# Patient Record
Sex: MALE
[De-identification: names, ages, dates, MRNs, and addresses within clinical notes are randomized per-mention and may not be internally consistent; named-entity substitution may affect disease eponyms.]

---

## 2023-02-21 PROBLEM — Z00.00 ENCOUNTER FOR PREVENTIVE HEALTH EXAMINATION: Status: ACTIVE | Noted: 2023-02-21

## 2023-03-06 ENCOUNTER — APPOINTMENT (OUTPATIENT)
Dept: COLORECTAL SURGERY | Facility: CLINIC | Age: 43
End: 2023-03-06
Payer: COMMERCIAL

## 2023-03-06 VITALS
WEIGHT: 185 LBS | HEART RATE: 111 BPM | DIASTOLIC BLOOD PRESSURE: 82 MMHG | BODY MASS INDEX: 22.53 KG/M2 | SYSTOLIC BLOOD PRESSURE: 111 MMHG | TEMPERATURE: 97.2 F | HEIGHT: 76 IN | OXYGEN SATURATION: 98 % | RESPIRATION RATE: 16 BRPM

## 2023-03-06 DIAGNOSIS — K64.4 RESIDUAL HEMORRHOIDAL SKIN TAGS: ICD-10-CM

## 2023-03-06 PROCEDURE — 99203 OFFICE O/P NEW LOW 30 MIN: CPT

## 2023-03-06 NOTE — HISTORY OF PRESENT ILLNESS
[FreeTextEntry1] : 41 y/o M presents for initial evaluation of hemorrhoids. \par \par PMH: Denies\par PSH: Broken thumb - 6 screws and a plate (had surgery 2 weeks ago, still in splint), hemorrhoid rubber band ligation x 2 (8/2020)\par Denies [facility] Colorectal CA or IBD\par \par Saw Dr Kendrick 3 years ago for BRB on TP. \par \par Colonoscopy/EGD 4/29/2019\par - columnar mucosa showing intestinal metaplasia c/w Loera's esophagus. Negative for dysplasia.\par - Sigmoid polypectomy revealed tubular adenoma\par \par Last Flex sig 8/17/2020 Dr. Poonam Kendrick:\par - perianal swollen external hemorrhoids skin tags found on perianal exam\par - normal mucosa in the rectum and in distal sigmoid colon\par - Non bleeding internal hemorrhoids, medium sized, Grade II, s/p two bands placed. \par - No specimens collected. \par  \par After rubber banding, he felt his hemorrhoids worsened and external hemorrhoids formed (patient thinks due to bowel prep). \par Daily BM. Toilet time is 10-15 minutes. Reports fragmented BMs and incomplete stooling. \par Admits at times he doesn't drink enough water. \par Sees blood once a month, on TP. And swelling of perianal tissue. \par Dr Kendrick recommended fiber supplement. \par He recently started taking magnesium oxide supplement from his girlfriend to help with BM (2 capsules per day) and going easier. Less swelling. And hemorrhoid symptoms decreased. \par After BM he lays flat on ground and the swelling goes down.\par \par Previously when lived in [place], did warm baths and sitz baths and used preparation H. He found this is not as effective as laying down, so no longer doing. \par He had Rx hydrocortisone previously in [place] but not since. \par Moved from [place] to [place] about 3 years ago. \par

## 2023-03-06 NOTE — PHYSICAL EXAM
[FreeTextEntry1] : General no acute distress, alert and oriented\par Psych responding appropriately to question\par Well nourished\par Comfortable in chair\par Ambulating without assistance\par Nonlabored breathing\par Abdomen non distended\par Left upper extremity splint in place. \par \par Patient declines anorectal examination today, citing recent thumb surgery impacting his mobility to participate in a physical exam.

## 2023-03-06 NOTE — ASSESSMENT
[FreeTextEntry1] : Diagnosis and management options discussed at length with patient. \par He was treated with rubber band ligation in past by GI for internal hemorrhoids. Per patient he was referred by GI to discussed external hemorrhoid management. \par Recommendations including increased fiber intake, adequate daily hydration, stool softeners/laxatives as needed, and sitz baths as needed and after bowel movements were discussed.\par Avoid constipation and diarrhea, avoid pushing/straining.\par Management options discussed, including supportive care. Witch hazel and hydrocortisone as needed for symptoms were discussed.\par We discussed surgical hemorrhoidectomy as an alternative. \par The nature of the procedure as well as risks and benefits were reviewed with the patient. Risk/benefits/alternatives discussed at length, including but not limited to pain, bleeding, infection, swelling, recurrence of symptoms, need for future surgery, scarring, and risk of alteration of bowel habits, which may be temporary or permanent. Postprocedural expectations regarding pain, wound cosmesis, and wound healing was discussed.  \par He would like to continue with supportive care. He will follow up for physical exam at a future visit, and to further discuss management options after examination.\par All questions were answered, patient expressed understanding, and is agreeable to this plan.\par \par

## 2024-11-27 VITALS
DIASTOLIC BLOOD PRESSURE: 82 MMHG | HEIGHT: 76 IN | RESPIRATION RATE: 16 BRPM | WEIGHT: 191.14 LBS | OXYGEN SATURATION: 98 % | TEMPERATURE: 98 F | SYSTOLIC BLOOD PRESSURE: 114 MMHG | HEART RATE: 90 BPM

## 2024-11-27 NOTE — ASU PATIENT PROFILE, ADULT - FALL HARM RISK - HARM RISK INTERVENTIONS

## 2024-11-27 NOTE — ASU PATIENT PROFILE, ADULT - NSICDXPASTSURGICALHX_GEN_ALL_CORE_FT
PAST SURGICAL HISTORY:  History of colonoscopy     History of thumb surgery left    History of wisdom tooth extraction     Internal hemorrhoid banding

## 2024-11-29 ENCOUNTER — OUTPATIENT (OUTPATIENT)
Dept: OUTPATIENT SERVICES | Facility: HOSPITAL | Age: 44
LOS: 1 days | Discharge: HOME | End: 2024-11-29
Payer: COMMERCIAL

## 2024-11-29 ENCOUNTER — TRANSCRIPTION ENCOUNTER (OUTPATIENT)
Age: 44
End: 2024-11-29

## 2024-11-29 VITALS
OXYGEN SATURATION: 96 % | HEART RATE: 76 BPM | RESPIRATION RATE: 17 BRPM | DIASTOLIC BLOOD PRESSURE: 75 MMHG | SYSTOLIC BLOOD PRESSURE: 116 MMHG

## 2024-11-29 DIAGNOSIS — Z98.890 OTHER SPECIFIED POSTPROCEDURAL STATES: Chronic | ICD-10-CM

## 2024-11-29 DIAGNOSIS — K64.8 OTHER HEMORRHOIDS: Chronic | ICD-10-CM

## 2024-11-29 DIAGNOSIS — K08.409 PARTIAL LOSS OF TEETH, UNSPECIFIED CAUSE, UNSPECIFIED CLASS: Chronic | ICD-10-CM

## 2024-11-29 PROCEDURE — 46260 REMOVE IN/EX HEM GROUPS 2+: CPT

## 2024-11-29 PROCEDURE — C9399: CPT

## 2024-11-29 PROCEDURE — 88304 TISSUE EXAM BY PATHOLOGIST: CPT

## 2024-11-29 PROCEDURE — 88304 TISSUE EXAM BY PATHOLOGIST: CPT | Mod: 26

## 2024-11-29 RX ORDER — OXYCODONE AND ACETAMINOPHEN 5; 325 MG/1; MG/1
1 TABLET ORAL
Qty: 30 | Refills: 0
Start: 2024-11-29 | End: 2024-12-04

## 2024-11-29 RX ORDER — IRON SUCROSE 20 MG/ML
100 INJECTION, SOLUTION INTRAVENOUS
Refills: 0 | DISCHARGE

## 2024-11-29 RX ORDER — OMEPRAZOLE 20 MG/1
1 CAPSULE, DELAYED RELEASE ORAL
Refills: 0 | DISCHARGE

## 2024-11-29 RX ORDER — FERROUS SULFATE 325(65) MG
0 TABLET ORAL
Refills: 0 | DISCHARGE

## 2024-11-29 RX ORDER — DIAZEPAM 10 MG/1
1 TABLET ORAL
Qty: 4 | Refills: 0
Start: 2024-11-29 | End: 2024-12-02

## 2024-11-29 RX ORDER — HYDROMORPHONE HYDROCHLORIDE 2 MG/1
0.25 TABLET ORAL
Refills: 0 | Status: DISCONTINUED | OUTPATIENT
Start: 2024-11-29 | End: 2024-11-29

## 2024-11-29 RX ORDER — 0.9 % SODIUM CHLORIDE 0.9 %
1000 INTRAVENOUS SOLUTION INTRAVENOUS
Refills: 0 | Status: DISCONTINUED | OUTPATIENT
Start: 2024-11-29 | End: 2024-11-29

## 2024-11-29 RX ORDER — DOCUSATE SODIUM 100 MG
1 CAPSULE ORAL
Qty: 14 | Refills: 0
Start: 2024-11-29 | End: 2024-12-05

## 2024-11-29 RX ORDER — ONDANSETRON HYDROCHLORIDE 4 MG/1
4 TABLET, FILM COATED ORAL ONCE
Refills: 0 | Status: DISCONTINUED | OUTPATIENT
Start: 2024-11-29 | End: 2024-11-29

## 2024-11-29 RX ORDER — NALOXONE HCL 0.4 MG/ML
1 AMPUL (ML) INJECTION
Qty: 1 | Refills: 0
Start: 2024-11-29

## 2024-11-29 NOTE — ASU DISCHARGE PLAN (ADULT/PEDIATRIC) - CARE PROVIDER_API CALL
Geoff Toth.  Colon/Rectal Surgery  [address], [address], [place] [facility]  Phone: [phone]  Fax: [phone]  Follow Up Time:    Geoff Toth.  Colon/Rectal Surgery  [address] [place] [address]  Phone: [phone]  Fax: [phone]  Follow Up Time: 1 month

## 2024-11-29 NOTE — BRIEF OPERATIVE NOTE - OPERATION/FINDINGS
Patient prepped and draped in sterile fashion in prone kayla-knife position. Hemorrhoids visualized in right posterior, right anterior, and left lateral positions. Right posterior hemorrhoid suture ligated at base and excised with electrocautery, closed with running suture and collected as specimen. Hemostasis achieved. Right anterior and left lateral hemorrhoids excised in similar fashion. Hemostasis achieved.

## 2024-11-29 NOTE — ASU DISCHARGE PLAN (ADULT/PEDIATRIC) - FINANCIAL ASSISTANCE
[facility] provides services at a reduced cost to those who are determined to be eligible through [facility]’s financial assistance program. Information regarding [facility]’s financial assistance program can be found by going to [email or web address] or by calling [phone].

## 2024-11-29 NOTE — PRE-ANESTHESIA EVALUATION ADULT - NSANTHOSAYNRD_GEN_A_CORE
No. GORGE screening performed.  STOP BANG Legend: 0-2 = LOW Risk; 3-4 = INTERMEDIATE Risk; 5-8 = HIGH Risk

## 2024-11-29 NOTE — ASU DISCHARGE PLAN (ADULT/PEDIATRIC) - ASU DC SPECIAL INSTRUCTIONSFT
You may alternate between Tylenol and Motrin as needed for mild to moderate pain.  You have been prescribed Percocet, take 1 tab every 6 hours as needed for severe pain. Percocet contains Acetaminophen, no not exceed 4g of Acetaminophen in a 24 hour period.  Please follow up with Dr. Toth, follow all discharge instructions provided to you by Dr. Toth's office. You may call his office with any questions or concerns.    Geoff Toth.  Colon/Rectal Surgery  [address] [place] [address]  Phone: [phone]  Fax: [phone] You may alternate between Tylenol and Motrin as needed for mild to moderate pain.  You have been prescribed Percocet, take 1 tab every 6 hours as needed for severe pain. Percocet contains Acetaminophen, no not exceed 4g of Acetaminophen in a 24 hour period.  You have been prescribed Valium, take 1 pill once a day for 4 days as needed.  Please follow up with Dr. Toth, follow all discharge instructions provided to you by Dr. Toth's office. You may call his office with any questions or concerns.    Geoff Toth.  Colon/Rectal Surgery  [address], [place] [address]  Phone: [phone]  Fax: [phone]

## (undated) DEVICE — GOWN XXL

## (undated) DEVICE — GLV 8.5 PROTEXIS (WHITE)

## (undated) DEVICE — NDL HYPO SAFE 22G X 1.5" (BLACK)

## (undated) DEVICE — PACK GENERAL MINOR

## (undated) DEVICE — POSITIONER FOAM EGG CRATE ULNAR 2PCS (PINK)

## (undated) DEVICE — VENODYNE/SCD SLEEVE CALF MEDIUM

## (undated) DEVICE — TAPE SILK 3"

## (undated) DEVICE — WARMING BLANKET UPPER ADULT

## (undated) DEVICE — SUT VICRYL 2-0 27" CT-1

## (undated) DEVICE — GLV 8 PROTEXIS (WHITE)